# Patient Record
Sex: FEMALE | Race: WHITE | NOT HISPANIC OR LATINO | URBAN - METROPOLITAN AREA
[De-identification: names, ages, dates, MRNs, and addresses within clinical notes are randomized per-mention and may not be internally consistent; named-entity substitution may affect disease eponyms.]

---

## 2017-11-08 ENCOUNTER — OUTPATIENT (OUTPATIENT)
Dept: OUTPATIENT SERVICES | Facility: HOSPITAL | Age: 47
LOS: 1 days | Discharge: HOME | End: 2017-11-08

## 2017-11-08 DIAGNOSIS — N39.0 URINARY TRACT INFECTION, SITE NOT SPECIFIED: ICD-10-CM

## 2017-12-04 ENCOUNTER — OUTPATIENT (OUTPATIENT)
Dept: OUTPATIENT SERVICES | Facility: HOSPITAL | Age: 47
LOS: 1 days | Discharge: HOME | End: 2017-12-04

## 2017-12-04 DIAGNOSIS — G44.209 TENSION-TYPE HEADACHE, UNSPECIFIED, NOT INTRACTABLE: ICD-10-CM

## 2017-12-06 ENCOUNTER — APPOINTMENT (OUTPATIENT)
Dept: NEUROLOGY | Facility: CLINIC | Age: 47
End: 2017-12-06
Payer: COMMERCIAL

## 2017-12-06 VITALS
OXYGEN SATURATION: 97 % | WEIGHT: 132 LBS | HEIGHT: 64 IN | BODY MASS INDEX: 22.53 KG/M2 | SYSTOLIC BLOOD PRESSURE: 140 MMHG | DIASTOLIC BLOOD PRESSURE: 90 MMHG | HEART RATE: 84 BPM

## 2017-12-06 DIAGNOSIS — Z83.3 FAMILY HISTORY OF DIABETES MELLITUS: ICD-10-CM

## 2017-12-06 DIAGNOSIS — G43.719 CHRONIC MIGRAINE W/OUT AURA, INTRACTABLE, W/OUT STATUS MIGRAINOSUS: ICD-10-CM

## 2017-12-06 DIAGNOSIS — Z78.9 OTHER SPECIFIED HEALTH STATUS: ICD-10-CM

## 2017-12-06 DIAGNOSIS — Z84.1 FAMILY HISTORY OF DISORDERS OF KIDNEY AND URETER: ICD-10-CM

## 2017-12-06 PROBLEM — Z00.00 ENCOUNTER FOR PREVENTIVE HEALTH EXAMINATION: Status: ACTIVE | Noted: 2017-12-06

## 2017-12-06 PROCEDURE — 99243 OFF/OP CNSLTJ NEW/EST LOW 30: CPT

## 2017-12-13 PROBLEM — Z84.1 FAMILY HISTORY OF RENAL FAILURE: Status: ACTIVE | Noted: 2017-12-06

## 2017-12-13 PROBLEM — Z78.9 DOES NOT USE ILLICIT DRUGS: Status: ACTIVE | Noted: 2017-12-06

## 2017-12-13 PROBLEM — Z83.3 FAMILY HISTORY OF DIABETES MELLITUS: Status: ACTIVE | Noted: 2017-12-06

## 2017-12-13 RX ORDER — SERTRALINE HYDROCHLORIDE 50 MG/1
50 TABLET, FILM COATED ORAL DAILY
Refills: 0 | Status: ACTIVE | COMMUNITY

## 2019-04-03 ENCOUNTER — APPOINTMENT (OUTPATIENT)
Dept: NEUROLOGY | Facility: CLINIC | Age: 49
End: 2019-04-03
Payer: COMMERCIAL

## 2019-04-03 VITALS
SYSTOLIC BLOOD PRESSURE: 110 MMHG | OXYGEN SATURATION: 98 % | HEART RATE: 85 BPM | DIASTOLIC BLOOD PRESSURE: 82 MMHG | HEIGHT: 64 IN | WEIGHT: 140 LBS | BODY MASS INDEX: 23.9 KG/M2

## 2019-04-03 DIAGNOSIS — R51 HEADACHE: ICD-10-CM

## 2019-04-03 PROCEDURE — 99214 OFFICE O/P EST MOD 30 MIN: CPT

## 2019-04-03 RX ORDER — PREDNISONE 20 MG/1
20 TABLET ORAL DAILY
Qty: 10 | Refills: 0 | Status: DISCONTINUED | COMMUNITY
Start: 2017-12-06 | End: 2019-04-03

## 2019-04-03 RX ORDER — LEVOFLOXACIN 250 MG/1
250 TABLET, FILM COATED ORAL
Qty: 3 | Refills: 0 | Status: DISCONTINUED | COMMUNITY
Start: 2017-11-01 | End: 2019-04-03

## 2019-04-03 RX ORDER — ALPRAZOLAM 0.5 MG/1
0.5 TABLET ORAL
Qty: 10 | Refills: 0 | Status: DISCONTINUED | COMMUNITY
Start: 2017-11-06 | End: 2019-04-03

## 2019-04-04 NOTE — HISTORY OF PRESENT ILLNESS
[FreeTextEntry1] : Patient presents for follow up.\par \par She reports daily headaches, neck pain, leg fatigability and intermittent blurred vision.\par \par Headaches have increased in intensity and duration since last visit 12/2017. Occur daily and are about 3-4/10 in intensity. Quality is pressure / squeezing, sometimes relieved with tylenol. Associated with photophobia and sonophobia. She cannot identify aggravating or relieving factors. \par \par Also reports persistent neck pain. Xray was done by PCP which reportedly showed arthritis. She feels there is a component of muscle tightness to neck pain. She started PT a few weeks ago and does not appreciate any improvement- only did 3 sessions. She denies any weakness or numbness to the arms or hands. She has never had an MRI of the spine.\par \par She also feels fatigability / heaviness to bilateral legs, especially when going up a flight of stairs. Has never had an MRI of the lower back.\par \par Also reports left eye blurred vision that was intermittent a few weeks ago. Was evaluated by ophthalmologist and was told the exam was normal.\par

## 2019-04-04 NOTE — DISCUSSION/SUMMARY
[FreeTextEntry1] : Hyper-reflexia and left facial droop on exam\par \par Reports of intermittent blurred vision and muscle fatigability\par \par Differentials: demyelinating disease v. rheumatological condition v. vasculitis \par Plan:\par \par 1. brain MRI and cervical spinal\par \par 2. joint pain / achiness- referral for rheumatologist: Dr. Vu\par \par 3. chronic headaches- start topamax 25mg HS \par \par 4. Labs for sed rate, crp, kris , rf, hc work up\par \par 5. RTC 2-4 weeks with testing complete \par

## 2019-04-04 NOTE — PHYSICAL EXAM
[FreeTextEntry1] : Constitutional: \par -Alert, in no acute distress and well nourished\par Psychiatric: \par -Oriented to person, place, and time\par -Insight and judgment intact\par -Normal affect \par Neurologic: \par -Memory: short term, remote and recent memory intact \par -Language: fluency intact, no dysarthria \par Cranial Nerves: \par -Visual acuity intact bilaterally\par -Visual fields full to confrontation\par -Pupils equal round and reactive to light\par -Extraocular motion intact - END GAZE NYSTAGMUS \par -Facial sensation intact symmetrically\par -LEFT FACIAL DROOP - MINOR \par -Hearing grossly intact bilaterally\par -Tongue and palate midline\par -Had turning and shoulder shrug symmetric \par Motor: \par -Muscle tone normal in all four extremities\par -Muscle strength normal in all four extremities\par -No pronator drift on the right. no pronator drift on the left\par Sensory exam\par -Light touch intact\par -Pain and temperature intact \par -Vibration intact \par Coordination:. \par -Normal gait\par -Balance intact. \par -Romberg's sign negative\par -No past-pointing\par -No tremor present\par - No dysmetria on finger to nose or heel to shin.\par Deep tendon reflexes: \par -2-3 throughout\par -Plantar responses normal on the right, EQUIVOCAL on the left. \par -Ankle Clonus absent on the right, absent on the left.  \par Eyes: \par -Sclera and conjunctiva normal\par -Pupils equal in size, round, reactive to light, with normal accommodation\par -Extraocular movements intact \par -Full visual field \par Musculoskeletal: \par -Normal gait \par -Muscle strength and tone normal. \par -ELEVATED RIGHT SHOULDER \par Skin:\par -Normal skin color and pigmentation\par -Normal skin turgor \par -No skin lesions\par Respiratory:\par -No respiratory distress

## 2019-05-21 ENCOUNTER — APPOINTMENT (OUTPATIENT)
Dept: RHEUMATOLOGY | Facility: CLINIC | Age: 49
End: 2019-05-21

## 2019-05-29 ENCOUNTER — APPOINTMENT (OUTPATIENT)
Dept: NEUROLOGY | Facility: CLINIC | Age: 49
End: 2019-05-29
Payer: COMMERCIAL

## 2019-05-29 VITALS
SYSTOLIC BLOOD PRESSURE: 135 MMHG | DIASTOLIC BLOOD PRESSURE: 80 MMHG | HEART RATE: 80 BPM | BODY MASS INDEX: 24.75 KG/M2 | HEIGHT: 64 IN | OXYGEN SATURATION: 98 % | WEIGHT: 145 LBS

## 2019-05-29 DIAGNOSIS — G95.9 DISEASE OF SPINAL CORD, UNSPECIFIED: ICD-10-CM

## 2019-05-29 PROCEDURE — 99214 OFFICE O/P EST MOD 30 MIN: CPT

## 2019-09-04 ENCOUNTER — APPOINTMENT (OUTPATIENT)
Dept: NEUROLOGY | Facility: CLINIC | Age: 49
End: 2019-09-04
Payer: COMMERCIAL

## 2019-09-04 VITALS
WEIGHT: 147 LBS | HEIGHT: 64 IN | OXYGEN SATURATION: 97 % | HEART RATE: 83 BPM | BODY MASS INDEX: 25.1 KG/M2 | SYSTOLIC BLOOD PRESSURE: 132 MMHG | DIASTOLIC BLOOD PRESSURE: 80 MMHG

## 2019-09-04 DIAGNOSIS — G12.21 AMYOTROPHIC LATERAL SCLEROSIS: ICD-10-CM

## 2019-09-04 PROCEDURE — 99214 OFFICE O/P EST MOD 30 MIN: CPT

## 2019-09-04 RX ORDER — TOPIRAMATE 25 MG/1
25 TABLET, FILM COATED ORAL
Qty: 30 | Refills: 2 | Status: DISCONTINUED | COMMUNITY
Start: 2019-04-03 | End: 2019-09-04

## 2019-09-09 PROBLEM — G95.9 MYELOPATHY: Status: ACTIVE | Noted: 2019-09-04

## 2019-09-09 NOTE — PHYSICAL EXAM
[FreeTextEntry1] : The patient is alert and oriented x3, naming intact x3, repetition normal, follows three-step commands, and is able to participate fully in the history taking.\par Speech is normal with no evidence of dysarthria.\par Memory is intact: Immediate recall 3 out of 3, short-term 3 out of 3, remote memory intact\par Cranial nerves II through XII intact\par Motor exam: Upper and lower extremities 5 out of 5 power, normal tone. No abnormal movements noted.\par Sensory exam: Intact to light touch and pinprick. Romberg negative.\par Coordination and vestibular exam: Finger to nose intact, no evidence of truncal or appendicular ataxia. No evidence of nystagmus. no vestibular symptoms elicited with head turning during ambulation.\par Gait: Normal stance and gait.\par Reflexes: One to 2+ in upper and lower extremities. No pathological reflexes. Downgoing toes.\par \par HEENT: Normocephalic atraumatic\par Funduscopic: No disc edema\par Neck supple with no JVD. No evidence of carotid bruit.\par Cardiac: S1-S2 regular rate and rhythm, no murmur noted.\par Chest: Clear to auscultation\par Abdomen: nontender, normal bowel sounds, soft\par Extremity: No edema, normal pulses.\par \par \par

## 2019-09-09 NOTE — REVIEW OF SYSTEMS
[Feeling Poorly] : feeling poorly [Feeling Tired] : feeling tired [As Noted in HPI] : as noted in HPI [Migraine Headache] : migraine headaches [Negative] : Heme/Lymph

## 2019-09-09 NOTE — HISTORY OF PRESENT ILLNESS
[FreeTextEntry1] : . \par \par Patient still complains about the neck and muscle fatigue. increased paresthesias now in arms and legs x 2 weeks\par neck pain is worse. \par MRI denied for no reason. will resubmit since patient has an upgoing toe and bilateral neck and bleow sensory symptoms\par \par Denies blurry vision- has floaters. \par \par Patient complains of heavy legs b/l--feels like lead. It has been on and off for a while. \par Patient still reports 2-3 headaches a week- not sure if it's neck pain. Says she didn't take Topamax 25mg again.\par \par Patient has had more anxiety.

## 2019-09-09 NOTE — HISTORY OF PRESENT ILLNESS
[FreeTextEntry1] : 49 y/o F w/pmh of persistent headaches and neck pain present for a follow up visit. \par \par Patient still complains about the neck and muscle fatigue. Patient saw a rheumatologist, Dr. Cunningham (not the one that was not recommended) and says she may have fibromyalgia. She saw him 2 weeks ago and didn't follow up. \par \par Denies blurry vision- has floaters. \par \par Patient complains of heavy legs b/l--feels like lead. It has been on and off for a while. She took up biking and thinks it may help. \par \par Patient still reports 2-3 headaches a week- not sure if it's neck pain. Says she didn't take Topamax 25mg.

## 2019-09-09 NOTE — ASSESSMENT
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 u with dr. antoine\par \par right foot toes upward

## 2019-09-17 DIAGNOSIS — R29.2 ABNORMAL REFLEX: ICD-10-CM

## 2019-09-27 PROBLEM — R29.2: Status: ACTIVE | Noted: 2019-09-27

## 2020-09-30 ENCOUNTER — APPOINTMENT (OUTPATIENT)
Dept: NEUROLOGY | Facility: CLINIC | Age: 50
End: 2020-09-30
Payer: COMMERCIAL

## 2020-09-30 VITALS
OXYGEN SATURATION: 97 % | WEIGHT: 141 LBS | SYSTOLIC BLOOD PRESSURE: 130 MMHG | BODY MASS INDEX: 24.07 KG/M2 | TEMPERATURE: 97.6 F | HEIGHT: 64 IN | HEART RATE: 81 BPM | DIASTOLIC BLOOD PRESSURE: 82 MMHG

## 2020-09-30 DIAGNOSIS — G60.8 OTHER HEREDITARY AND IDIOPATHIC NEUROPATHIES: ICD-10-CM

## 2020-09-30 DIAGNOSIS — M54.2 CERVICALGIA: ICD-10-CM

## 2020-09-30 DIAGNOSIS — R70.0 ELEVATED ERYTHROCYTE SEDIMENTATION RATE: ICD-10-CM

## 2020-09-30 PROCEDURE — 99214 OFFICE O/P EST MOD 30 MIN: CPT

## 2020-10-01 ENCOUNTER — TRANSCRIPTION ENCOUNTER (OUTPATIENT)
Age: 50
End: 2020-10-01

## 2020-10-01 NOTE — ASSESSMENT
[FreeTextEntry1] : Physical therapy for neck pain\par Repeat ESR- pt states recent lab from other doctor was 57\par MEIR, double stranded DNS\par Advised to exercise\par Referral to see Dr. Vu, rheum for muscle weakness/heaviness and elevated inflammatory markers.

## 2020-10-01 NOTE — HISTORY OF PRESENT ILLNESS
[FreeTextEntry1] : 49 year old right handed female patient presents for a follow up. \par \par Patient complains of neck pain- says she only had done PT for a bit. \par Has been home this whole time during the pandemic. Is not exercising. \par \par Occasional headaches. \par

## 2020-10-01 NOTE — PHYSICAL EXAM
[FreeTextEntry1] : General: sitting on exam table comfortably, smiling, NAD\par CV: RRR\par Extremities: FROMx4, 2+ radial pulses b/l\par \par Neurological Exam:\par Mental Status: AA&O x3, fluent, no dysarthria, follows all commands, able to tell full history, attention intact\par Cranial nerves: EOMI, facial sensation intact, no facial droop, tongue midline\par Motor: No pronator drift, 5/5 x4, normal bulk and tone\par Sensory: Intact light touch bilaterally\par Reflexes: UE 2+, LE  2+\par Cerebellar: FNF intact b/l\par Gait: steady\par \par +muscle tightness of neck